# Patient Record
Sex: FEMALE | Race: ASIAN | NOT HISPANIC OR LATINO | Employment: FULL TIME | URBAN - METROPOLITAN AREA
[De-identification: names, ages, dates, MRNs, and addresses within clinical notes are randomized per-mention and may not be internally consistent; named-entity substitution may affect disease eponyms.]

---

## 2017-02-16 ENCOUNTER — TRANSFERRED RECORDS (OUTPATIENT)
Dept: HEALTH INFORMATION MANAGEMENT | Facility: CLINIC | Age: 42
End: 2017-02-16

## 2017-02-17 ENCOUNTER — OFFICE VISIT (OUTPATIENT)
Dept: URGENT CARE | Facility: URGENT CARE | Age: 42
End: 2017-02-17
Payer: COMMERCIAL

## 2017-02-17 VITALS
HEIGHT: 63 IN | HEART RATE: 66 BPM | TEMPERATURE: 97.7 F | DIASTOLIC BLOOD PRESSURE: 90 MMHG | SYSTOLIC BLOOD PRESSURE: 138 MMHG | WEIGHT: 123 LBS | BODY MASS INDEX: 21.79 KG/M2 | OXYGEN SATURATION: 99 %

## 2017-02-17 DIAGNOSIS — J01.90 ACUTE SINUSITIS WITH COEXISTING CONDITION REQUIRING PROPHYLACTIC TREATMENT: Primary | ICD-10-CM

## 2017-02-17 PROCEDURE — 99213 OFFICE O/P EST LOW 20 MIN: CPT | Performed by: INTERNAL MEDICINE

## 2017-02-17 RX ORDER — FLUTICASONE PROPIONATE 50 MCG
1-2 SPRAY, SUSPENSION (ML) NASAL DAILY
Qty: 1 BOTTLE | Refills: 0 | Status: SHIPPED | OUTPATIENT
Start: 2017-02-17

## 2017-02-17 RX ORDER — DROSPIRENONE AND ETHINYL ESTRADIOL 0.03MG-3MG
1 KIT ORAL DAILY
COMMUNITY

## 2017-02-17 NOTE — MR AVS SNAPSHOT
"              After Visit Summary   2/17/2017    Annmarie Jimenes    MRN: [id number]           Patient Information     Date Of Birth          1975        Visit Information        Provider Department      2/17/2017 8:55 PM Kate Luis MD [facility]        Today's Diagnoses     Acute sinusitis with coexisting condition requiring prophylactic treatment    -  1      Care Instructions    Treat as presumed sinus infection with antibiotics and nasal steroid  Nasal saline  Menthol    Call or return to clinic if symptoms worsen or fail to improve as anticipated.          Follow-ups after your visit        Who to contact     If you have questions or need follow up information about today's clinic visit or your schedule please contact [facility] directly at [phone].  Normal or non-critical lab and imaging results will be communicated to you by memory lane [employer], letter or phone within 4 business days after the clinic has received the results. If you do not hear from us within 7 days, please contact the clinic through memory lane synd[employer]hart or phone. If you have a critical or abnormal lab result, we will notify you by phone as soon as possible.  Submit refill requests through [employer] or call your pharmacy and they will forward the refill request to us. Please allow 3 business days for your refill to be completed.          Additional Information About Your Visit        memory lane syndicationshart Information     [employer] lets you send messages to your doctor, view your test results, renew your prescriptions, schedule appointments and more. To sign up, go to [email or web address] . Click on \"Log in\" on the left side of the screen, which will take you to the Welcome page. Then click on \"Sign up Now\" on the right side of the page.     You will be asked to enter the access code listed below, as well as some personal information. Please follow the directions to create your username and password.     Your access " "code is: [email or web address]  Expires: 2017  9:50 PM     Your access code will  in 90 days. If you need help or a new code, please call your [facility] clinic or [phone].        Care EveryWhere ID     This is your Care EveryWhere ID. This could be used by other organizations to access your [facility] medical records  [id number]        Your Vitals Were     Pulse Temperature Height Last Period Pulse Oximetry Breastfeeding?    66 97.7  F (36.5  C) (Oral) 5' 3\" (1.6 m) 2017 (Approximate) 99% Yes    BMI (Body Mass Index)                   21.79 kg/m2            Blood Pressure from Last 3 Encounters:   17 138/90   11 118/71    Weight from Last 3 Encounters:   17 123 lb (55.8 kg)   11 120 lb (54.4 kg)              Today, you had the following     No orders found for display         Today's Medication Changes          These changes are accurate as of: 17  9:51 PM.  If you have any questions, ask your nurse or doctor.               Start taking these medicines.        Dose/Directions    amoxicillin-clavulanate 875-125 MG per tablet   Commonly known as:  AUGMENTIN   Used for:  Acute sinusitis with coexisting condition requiring prophylactic treatment   Started by:  Kate Luis MD        Dose:  1 tablet   Take 1 tablet by mouth 2 times daily   Quantity:  20 tablet   Refills:  0       fluticasone 50 MCG/ACT spray   Commonly known as:  FLONASE   Used for:  Acute sinusitis with coexisting condition requiring prophylactic treatment   Started by:  Kate Luis MD        Dose:  1-2 spray   Spray 1-2 sprays into both nostrils daily   Quantity:  1 Bottle   Refills:  0            Where to get your medicines      These medications were sent to [facility] [id number] [place] [address] [address] [place] [address] AT [address] [address]    Hours:  24-hours Phone:  [phone]     amoxicillin-clavulanate 875-125 MG per tablet "    fluticasone 50 MCG/ACT spray                Primary Care Provider Office Phone # Fax #    [facility] [phone] [address] [place] [address]        Thank you!     Thank you for choosing [facility]  for your care. Our goal is always to provide you with excellent care. Hearing back from our patients is one way we can continue to improve our services. Please take a few minutes to complete the written survey that you may receive in the mail after your visit with us. Thank you!             Your Updated Medication List - Protect others around you: Learn how to safely use, store and throw away your medicines at www.disposemymeds.org.          This list is accurate as of: 2/17/17  9:51 PM.  Always use your most recent med list.                   Brand Name Dispense Instructions for use    ACYCLOVIR PO          amoxicillin-clavulanate 875-125 MG per tablet    AUGMENTIN    20 tablet    Take 1 tablet by mouth 2 times daily       drospirenone-ethinyl estradiol 3-0.03 MG per tablet    JANEY     Take 1 tablet by mouth daily       fluticasone 50 MCG/ACT spray    FLONASE    1 Bottle    Spray 1-2 sprays into both nostrils daily       IBUPROFEN PO

## 2017-02-18 NOTE — PROGRESS NOTES
"SUBJECTIVE:   Annmarie Jimenes is a 42 year old female presenting with a chief complaint of  Chief Complaint   Patient presents with     Urgent Care     Sinus Problem     Monday night started to have right lower tooth pain, seen by dentist Tuesday and found no tooth problems.  Now has pain in right upper teeth radiating across upper lip to left side of face3, both ears hurt, had chills/sweats last night.       Onset of symptoms was 5 day(s) ago saw dentist for bottem tooth pain with normal xray  , then pain to upper teeth later night.    No runny nose .  Current and Associated symptoms: sore throat, tongue hurts,  Teeth hurt,   facial pain/pressure and chills  Treatment measures tried include acyclovir.  Predisposing factors include None.    Past Medical History   Diagnosis Date     Herpes      Current Outpatient Prescriptions   Medication Sig Dispense Refill     drospirenone-ethinyl estradiol (JANEY) 3-0.03 MG per tablet Take 1 tablet by mouth daily       ACYCLOVIR PO        IBUPROFEN PO        Social History   Substance Use Topics     Smoking status: Never Smoker     Smokeless tobacco: Never Used     Alcohol use Not on file       OBJECTIVE  :/90 (BP Location: Left arm, Cuff Size: Adult Small)  Pulse 66  Temp 97.7  F (36.5  C) (Oral)  Ht 5' 3\" (1.6 m)  Wt 123 lb (55.8 kg)  LMP 01/27/2017 (Approximate)  SpO2 99%  Breastfeeding? Yes  BMI 21.79 kg/m2  GENERAL APPEARANCE: healthy, alert and no distress  HENT: ear canals and TM's normal.  Nose and mouth without ulcers, erythema or lesions  HENT: maxillary sinus tenderness  and   No gum sores  Teeth nontender   Gums look healthy      ASSESSMENT:  (J01.90) Acute sinusitis with coexisting condition requiring prophylactic treatment  (primary encounter diagnosis)  Comment:   Plan: fluticasone (FLONASE) 50 MCG/ACT spray,         amoxicillin-clavulanate (AUGMENTIN) 875-125 MG         per tablet             Patient Instructions   Treat as presumed sinus infection with " antibiotics and nasal steroid  Nasal saline  Menthol    Call or return to clinic if symptoms worsen or fail to improve as anticipated.

## 2017-02-18 NOTE — NURSING NOTE
"Chief Complaint   Patient presents with     Urgent Care     Sinus Problem     Monday night started to have right lower tooth pain, seen by dentist Tuesday and found no tooth problems.  Now has pain in right upper teeth radiating across upper lip to left side of face3, both ears hurt, had chills/sweats last night.     Initial /90 (BP Location: Left arm, Cuff Size: Adult Small)  Pulse 66  Temp 97.7  F (36.5  C) (Oral)  Ht 5' 3\" (1.6 m)  Wt 123 lb (55.8 kg)  LMP 01/27/2017 (Approximate)  SpO2 99%  Breastfeeding? Yes  BMI 21.79 kg/m2 Estimated body mass index is 21.79 kg/(m^2) as calculated from the following:    Height as of this encounter: 5' 3\" (1.6 m).    Weight as of this encounter: 123 lb (55.8 kg)..  BP completed using cuff size: small regular  CAROL Cruz  "

## 2017-02-20 ENCOUNTER — MEDICAL CORRESPONDENCE (OUTPATIENT)
Dept: HEALTH INFORMATION MANAGEMENT | Facility: CLINIC | Age: 42
End: 2017-02-20

## 2018-01-11 NOTE — PATIENT INSTRUCTIONS
Treat as presumed sinus infection with antibiotics and nasal steroid  Nasal saline  Menthol    Call or return to clinic if symptoms worsen or fail to improve as anticipated.     Normal for race

## 2023-10-28 ENCOUNTER — HOSPITAL ENCOUNTER (EMERGENCY)
Facility: CLINIC | Age: 48
Discharge: HOME | End: 2023-10-28
Attending: EMERGENCY MEDICINE | Admitting: EMERGENCY MEDICINE
Payer: COMMERCIAL

## 2023-10-28 ENCOUNTER — OFFICE VISIT (OUTPATIENT)
Dept: URGENT CARE | Facility: URGENT CARE | Age: 48
End: 2023-10-28
Payer: COMMERCIAL

## 2023-10-28 VITALS
OXYGEN SATURATION: 99 % | HEART RATE: 66 BPM | TEMPERATURE: 98.4 F | BODY MASS INDEX: 21.79 KG/M2 | SYSTOLIC BLOOD PRESSURE: 112 MMHG | DIASTOLIC BLOOD PRESSURE: 73 MMHG | WEIGHT: 123 LBS

## 2023-10-28 VITALS
RESPIRATION RATE: 16 BRPM | SYSTOLIC BLOOD PRESSURE: 140 MMHG | TEMPERATURE: 97 F | HEART RATE: 74 BPM | OXYGEN SATURATION: 98 % | DIASTOLIC BLOOD PRESSURE: 96 MMHG

## 2023-10-28 DIAGNOSIS — S41.151A DOG BITE OF ARM, RIGHT, INITIAL ENCOUNTER: Primary | ICD-10-CM
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
## 2023-10-28 DIAGNOSIS — T14.8XXA ABRASION: ICD-10-CM

## 2023-10-28 DIAGNOSIS — S80.12XA CONTUSION OF LEFT LOWER LEG, INITIAL ENCOUNTER: ICD-10-CM
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
## 2023-10-28 PROCEDURE — 99284 EMERGENCY DEPT VISIT MOD MDM: CPT | Mod: 25

## 2023-10-28 PROCEDURE — 90472 IMMUNIZATION ADMIN EACH ADD: CPT | Performed by: EMERGENCY MEDICINE

## 2023-10-28 PROCEDURE — 90715 TDAP VACCINE 7 YRS/> IM: CPT | Performed by: EMERGENCY MEDICINE

## 2023-10-28 PROCEDURE — 90471 IMMUNIZATION ADMIN: CPT | Performed by: EMERGENCY MEDICINE

## 2023-10-28 PROCEDURE — 99202 OFFICE O/P NEW SF 15 MIN: CPT | Performed by: INTERNAL MEDICINE

## 2023-10-28 PROCEDURE — 250N000011 HC RX IP 250 OP 636: Performed by: EMERGENCY MEDICINE

## 2023-10-28 PROCEDURE — 90375 RABIES IG IM/SC: CPT | Performed by: EMERGENCY MEDICINE

## 2023-10-28 PROCEDURE — 90675 RABIES VACCINE IM: CPT | Performed by: EMERGENCY MEDICINE

## 2023-10-28 RX ADMIN — Medication 1 ML: at 22:39

## 2023-10-28 RX ADMIN — CLOSTRIDIUM TETANI TOXOID ANTIGEN (FORMALDEHYDE INACTIVATED), CORYNEBACTERIUM DIPHTHERIAE TOXOID ANTIGEN (FORMALDEHYDE INACTIVATED), BORDETELLA PERTUSSIS TOXOID ANTIGEN (GLUTARALDEHYDE INACTIVATED), BORDETELLA PERTUSSIS FILAMENTOUS HEMAGGLUTININ ANTIGEN (FORMALDEHYDE INACTIVATED), BORDETELLA PERTUSSIS PERTACTIN ANTIGEN, AND BORDETELLA PERTUSSIS FIMBRIAE 2/3 ANTIGEN 0.5 ML: 5; 2; 2.5; 5; 3; 5 INJECTION, SUSPENSION INTRAMUSCULAR at 22:40

## 2023-10-28 RX ADMIN — RABIES IMMUNE GLOBULIN (HUMAN) 1110 UNITS: 300 INJECTION, SOLUTION INFILTRATION; INTRAMUSCULAR at 23:07

## 2023-10-28 ASSESSMENT — ACTIVITIES OF DAILY LIVING (ADL): ADLS_ACUITY_SCORE: 33

## 2023-10-28 NOTE — PROGRESS NOTES
Assessment & Plan     Dog bite of arm, right, initial encounter  There are several characteristics of this encounter that suggest a significant risk of a rabies exposure:   [place] is known to have rabid dogs ([facility] [employer])  The dog was visibly unwell and behaving erratically  The attack was unprovoked  There is no possibility for assessing the animal    Factors that are lower risk include intact skin on the elbow at the site of the bite but the status of the left knee injury and contact with the animal's saliva is not clear.    These factors are suggesting a benefit of post-exposure prophylaxis so the patient is referred to ER.    Farooq Acevedo MD  [facility] [place]    Raina Anguiano is a 48 year old, presenting for the following health issues:  Urgent Care and Dog Bite (C/O dog bite for 2 days)    HPI   Patient was traveling in [facility] when a dog who was on the vacation property attacked and bit her.  He bit the patient on her bare skin on the elbow on 10/24.  No break in the skin.  This dog was unknown to anyone at the resort property. The dog also scratched her left leg on the medial aspect of the knee.  She had been applying some topical antibiotic.            Objective    /73   Pulse 66   Temp 98.4  F (36.9  C) (Tympanic)   Wt 55.8 kg (123 lb)   LMP 09/24/2023   SpO2 99%   BMI 21.79 kg/m    Body mass index is 21.79 kg/m .  Physical Exam   GENERAL APPEARANCE: healthy, alert, and no distress  SKIN: intact skin of the right elbow without apparent injury; the skin over the left medial thigh, about 3 cm proximal to the left knee, shows a linear abrasion with surrounding ecchymosis and some possible tooth marks

## 2023-10-29 NOTE — ED TRIAGE NOTES
Pt arrives from urgent care with c/o dog bite to R elbow and scratch on L thigh in [facility] on 10/24. Pt arrived home today. Skin is not broken. Pain is minimal.

## 2023-10-29 NOTE — DISCHARGE INSTRUCTIONS
He will need 3 additional rabies vaccines.  This should be done through the infusion center here at [facility].  This  should take place on November 1, November 4 and November 11.

## 2023-10-29 NOTE — PATIENT INSTRUCTIONS
Patient bitten by dog four days ago while traveling in [place].  The dog was unknown to anyone at the resort where she was staying, appeared unwell, and was only seen around the resort on that day.  Bite was unprovoked. Bit the patient on the right elbow - skin remains intact on the elbow and subsequently scratched the patient on the left knee with bruising and break to skin.      Please evaluate for presumed need for rabies post-exposure prophylaxis.

## 2023-10-29 NOTE — ED PROVIDER NOTES
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  The patient reports the dog came up out of nowhere and jumped on her.  There is no owner around.  She believes it was a stray dog.  She reports the dog's mouth went around her right elbow, but there is no puncture wound or abrasion or laceration or any skin injury to the patient's right elbow.  She then reports that on her left inner thigh the dog either scratched her with the dog's paw or possibly the mouth.  She is unsure.  This occurred 4 days ago.  She went to an urgent care and they recommended she come to the ED for rabies immunoglobulin and vaccine.  The patient reports that she had thoroughly irrigated both her elbow and the left inner thigh when she was in [facility].  She has not had any fevers, surrounding redness or drainage from the wounds.  She reports her tetanus is up-to-date.    Independent Historian:    The patient    Review of External Notes:  Urgent care note from Dr. Farooq William.    Medications:    Valtrex   Flonase   Atarax   Augmentin       Past Medical History:    Genital herpes   Positive JAREK  Burning mouth syndrome       Physical Exam   Patient Vitals for the past 24 hrs:   BP Temp Temp src Pulse Resp SpO2   10/28/23 2102 (!) 140/96 97  F (36.1  C) Temporal 74 16 98 %        Physical Exam  General:  Sitting on bed, comfortable appearing.   HENT:  No obvious trauma to head  Right Ear:  External ear normal.   Left Ear:  External ear normal.   Nose:  Nose normal.   Eyes:  Conjunctivae and EOM are normal.  Neck: Normal range of motion. Neck supple. No tracheal deviation present.   Pulm/Chest: No respiratory distress  M/S: Normal range of motion.   Neuro: Alert. GCS 15.  Skin: Skin is warm and dry.  No laceration, puncture wound, abrasion or any injury to the right elbow.  On the  left mid to lower inner thigh there is a contusion with a very slight healing abrasion.  No erythema, warmth, drainage or crepitus.  No rash noted. Not diaphoretic.   Psych: Normal mood and affect. Behavior is normal.       Emergency Department Course   Emergency Department Course & Assessments:    Interventions:  Medications   rabies immune globulin 300 units/mL (HYPERAB) injection 1,110 Units (has no administration in time range)   rabies vaccine, PCEC (chick embryo derived) (RABAVERT) injection 1 mL (1 mL Intramuscular $Given 10/28/23 2239)   Tdap (tetanus-diphtheria-acell pertussis) (ADACEL) injection 0.5 mL (0.5 mLs Intramuscular $Given 10/28/23 2240)        Assessments:  2152 I obtained history and examined the patient as noted above.    2300 I rechecked the patient's status and informed them of the findings.  I injected the rabies immunoglobulin into her left inner thigh abrasion wound.  She tolerated this well.    Independent Interpretation (X-rays, CTs, rhythm strip):  None    Consultations/Discussion of Management or Tests:  None       Social Determinants of Health affecting care:  None     Disposition:  The patient was discharged to home.     Impression & Plan    CMS Diagnoses: None      Medical Decision Making:  Annmarie Jimenes is a very pleasant 48 year old year old patient who presents to the emergency department with concern of an attack by a dog in [facility] as above.  Patient went to an urgent care.  It was recommended she come to the ED for postexposure prophylaxis treatment for rabies.  Reviewed the risk and benefit of this with her.  She desired and consented for it.  It was unclear if it is a dog's mouth or toenail that caused the abrasion on her inner thigh.  That was the only place that there was any break in the skin.  Given it appeared to be a stray dog acting erratically, she desired and consented for this.  I injected the immunoglobulin into the inner thigh.  She received her first rabies  vaccine.  I discussed that she will need additional rabies vaccines on day #3, 7 and 14.  An order was placed for this and a message was sent to our emergency department  coordinators to help arrange for these at the [facility] center.    The treatment plan was discussed with the patient and they expressed understanding of this plan and consented to the plan.  In addition, the patient will return to the emergency department if their symptoms persist, worsen, if new symptoms arise or if there is any concern as other pathology may be present that is not evident at this time. They also understand the importance of close follow up in the clinic and if unable to do so will return to the emergency department for a reevaluation. All questions were answered.    Diagnosis:    ICD-10-CM    1. Dog bite, initial encounter  W54.0XXA       2. Contusion of left lower leg, initial encounter  S80.12XA       3. Abrasion  T14.8XXA            Discharge Medications:  New Prescriptions    No medications on file          Scribe Disclosure:    I, Luis Armando Wolff, am serving as a scribe at 10:00 PM on 10/28/2023 to document services personally performed by Tim Duran DO based on my observations and the provider's statements to me.  10/28/2023   Tim Duran DO Anderson, Robert James, DO  10/28/23 2342

## 2023-10-30 NOTE — ED NOTES
I was asked to follow up with this patient re: rabies vaccines day 3, 7, 14.  After a chart check I saw that this patient was setup for her vaccines.  I contacted this patient to ensure she was aware of the appointments and she was as she set them up herself as follows:    Oct 31, 2023  3:30 PM---Day 3  Nov 04, 2023  8:30 AM---Day 7  Nov 11, 2023  8:00 AM---Day 14    [facility]  [address]  [facility] [address]  [phone]    I will update the ER provider.

## 2023-10-31 ENCOUNTER — INFUSION THERAPY VISIT (OUTPATIENT)
Dept: INFUSION THERAPY | Facility: CLINIC | Age: 48
End: 2023-10-31
Attending: EMERGENCY MEDICINE
Payer: COMMERCIAL

## 2023-10-31 VITALS
TEMPERATURE: 97.9 F | RESPIRATION RATE: 16 BRPM | DIASTOLIC BLOOD PRESSURE: 75 MMHG | SYSTOLIC BLOOD PRESSURE: 137 MMHG | HEART RATE: 66 BPM | OXYGEN SATURATION: 100 %
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
## 2023-10-31 PROCEDURE — 90471 IMMUNIZATION ADMIN: CPT | Performed by: EMERGENCY MEDICINE

## 2023-10-31 PROCEDURE — 250N000011 HC RX IP 250 OP 636: Performed by: EMERGENCY MEDICINE

## 2023-10-31 PROCEDURE — 90675 RABIES VACCINE IM: CPT | Performed by: EMERGENCY MEDICINE

## 2023-10-31 RX ADMIN — RABIES VACCINE 1 ML: KIT at 15:15

## 2023-10-31 ASSESSMENT — PAIN SCALES - GENERAL: PAINLEVEL: NO PAIN (0)

## 2023-10-31 NOTE — PROGRESS NOTES
Infusion Nursing Note:  Annmarie Jimenes presents today for rabies vaccine.    Patient seen by provider today: No   present during visit today: Not Applicable.    Note: N/A.      Intravenous Access:  No Intravenous access/labs at this visit.    Treatment Conditions:  Not Applicable.      Post Infusion Assessment:  Patient tolerated injection without incident.  Site patent and intact, free from redness, edema or discomfort.       Discharge Plan:   Discharge instructions reviewed with: Patient.  Patient and/or family verbalized understanding of discharge instructions and all questions answered.  AVS to patient via [employer].  Patient will return 11/4/23 for next appointment.   Patient discharged in stable condition accompanied by: self.  Departure Mode: Ambulatory.      Momo Graves RN

## 2023-11-04 ENCOUNTER — INFUSION THERAPY VISIT (OUTPATIENT)
Dept: INFUSION THERAPY | Facility: CLINIC | Age: 48
End: 2023-11-04
Attending: EMERGENCY MEDICINE
Payer: COMMERCIAL

## 2023-11-04 VITALS
DIASTOLIC BLOOD PRESSURE: 92 MMHG | RESPIRATION RATE: 18 BRPM | HEART RATE: 68 BPM | TEMPERATURE: 97.7 F | SYSTOLIC BLOOD PRESSURE: 139 MMHG | OXYGEN SATURATION: 100 %
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
## 2023-11-04 PROCEDURE — 250N000011 HC RX IP 250 OP 636: Performed by: EMERGENCY MEDICINE

## 2023-11-04 PROCEDURE — 90471 IMMUNIZATION ADMIN: CPT | Performed by: EMERGENCY MEDICINE

## 2023-11-04 PROCEDURE — 90675 RABIES VACCINE IM: CPT | Performed by: EMERGENCY MEDICINE

## 2023-11-04 RX ADMIN — RABIES VACCINE 1 ML: KIT at 08:10

## 2023-11-04 NOTE — PROGRESS NOTES
Infusion Nursing Note:  Annmarie Jimenes presents today for Rabies Vaccine Day 7.    Patient seen by provider today: No   present during visit today: Not Applicable.    Note: N/A.      Intravenous Access:  No Intravenous access/labs at this visit.    Treatment Conditions:  Not Applicable.      Post Infusion Assessment:  Patient tolerated injection without incident.  Site patent and intact, free from redness, edema or discomfort.  No evidence of extravasations.       Discharge Plan:   Patient declined prescription refills.  Discharge instructions reviewed with: Patient.  Patient and/or family verbalized understanding of discharge instructions and all questions answered.  AVS to patient via [employer].  Patient will return 11/11 for next appointment.   Patient discharged in stable condition accompanied by: self.  Departure Mode: Ambulatory.      Christopher Wilson RN

## 2023-11-11 ENCOUNTER — INFUSION THERAPY VISIT (OUTPATIENT)
Dept: INFUSION THERAPY | Facility: CLINIC | Age: 48
End: 2023-11-11
Attending: EMERGENCY MEDICINE
Payer: COMMERCIAL

## 2023-11-11 VITALS
HEART RATE: 58 BPM | TEMPERATURE: 97.8 F | RESPIRATION RATE: 18 BRPM | SYSTOLIC BLOOD PRESSURE: 115 MMHG | DIASTOLIC BLOOD PRESSURE: 71 MMHG
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
## 2023-11-11 PROCEDURE — 90471 IMMUNIZATION ADMIN: CPT | Performed by: EMERGENCY MEDICINE

## 2023-11-11 PROCEDURE — 90675 RABIES VACCINE IM: CPT | Performed by: EMERGENCY MEDICINE

## 2023-11-11 PROCEDURE — 250N000011 HC RX IP 250 OP 636: Performed by: EMERGENCY MEDICINE

## 2023-11-11 RX ADMIN — RABIES VACCINE 1 ML: KIT at 08:01

## 2023-11-11 NOTE — PROGRESS NOTES
Infusion Nursing Note:  Annmarie Jimenes presents today for rabies vaccine.    Patient seen by provider today: No   present during visit today: Not Applicable.    Note: N/A.      Intravenous Access:  No Intravenous access/labs at this visit.    Treatment Conditions:  Not Applicable.      Post Infusion Assessment:  Patient tolerated injection without incident.       Discharge Plan:   Discharge instructions reviewed with: Patient.  Patient and/or family verbalized understanding of discharge instructions and all questions answered.  Patient discharged in stable condition accompanied by: self.  Departure Mode: Ambulatory.      Joyce Grimm RN